# Patient Record
Sex: MALE | Race: BLACK OR AFRICAN AMERICAN | NOT HISPANIC OR LATINO | URBAN - METROPOLITAN AREA
[De-identification: names, ages, dates, MRNs, and addresses within clinical notes are randomized per-mention and may not be internally consistent; named-entity substitution may affect disease eponyms.]

---

## 2017-02-16 PROBLEM — Z00.00 ENCOUNTER FOR PREVENTIVE HEALTH EXAMINATION: Status: ACTIVE | Noted: 2017-02-16

## 2017-04-04 ENCOUNTER — EMERGENCY (EMERGENCY)
Facility: HOSPITAL | Age: 27
LOS: 1 days | Discharge: HOME | End: 2017-04-04
Attending: EMERGENCY MEDICINE
Payer: COMMERCIAL

## 2017-04-04 VITALS
HEIGHT: 71 IN | OXYGEN SATURATION: 99 % | TEMPERATURE: 99 F | HEART RATE: 64 BPM | SYSTOLIC BLOOD PRESSURE: 129 MMHG | DIASTOLIC BLOOD PRESSURE: 82 MMHG | RESPIRATION RATE: 18 BRPM | WEIGHT: 164.91 LBS

## 2017-04-04 DIAGNOSIS — L02.811 CUTANEOUS ABSCESS OF HEAD [ANY PART, EXCEPT FACE]: ICD-10-CM

## 2017-04-04 PROCEDURE — 99283 EMERGENCY DEPT VISIT LOW MDM: CPT

## 2017-04-04 PROCEDURE — 99282 EMERGENCY DEPT VISIT SF MDM: CPT

## 2017-04-04 RX ORDER — CEPHALEXIN 500 MG
1 CAPSULE ORAL
Qty: 28 | Refills: 0 | OUTPATIENT
Start: 2017-04-04 | End: 2017-04-11

## 2017-04-04 RX ORDER — CEPHALEXIN 500 MG
1 CAPSULE ORAL
Qty: 20 | Refills: 0 | OUTPATIENT
Start: 2017-04-04 | End: 2017-04-09

## 2017-04-04 NOTE — ED ADULT NURSE NOTE - OBJECTIVE STATEMENT
AOX3 +ambulatory patient reports headache and abscess on the back of his head. Patient was seen and evaluated by MD Nieves noted with a small lump no fevers

## 2018-04-18 NOTE — ED ADULT NURSE NOTE - LATERALITY
Care Management Interventions  PCP Verified by CM: Yes Bella Tinsley MD)  Mode of Transport at Discharge: Other (see comment) (family)  Transition of Care Consult (CM Consult): SNF  Partner SNF: No  Reason Why Partner SNF Not Chosen: Positive previous encounter  MyChart Signup: No  Discharge Durable Medical Equipment: No  Health Maintenance Reviewed: Yes  Physical Therapy Consult: Yes  Occupational Therapy Consult: Yes  Speech Therapy Consult: No  Current Support Network: Own Home  Confirm Follow Up Transport: Family  Plan discussed with Pt/Family/Caregiver: Yes  Freedom of Choice Offered: Yes  [address] Provided?: No  Discharge Location  Discharge Placement: Skilled nursing facility     Reason for Admission:   LEFT TOTAL HIP ARTHROPLASTY                  RRAT Score:   14               Do you (patient/family) have any concerns for transition/discharge? None. Plan for utilizing home health:  No, patient needs rehab. Likelihood of readmission? Low as patient will go to SNF             Transition of Care Plan:  SNF. Offered freedom of choice, patient prefers [facility]. Patient has been there before. CM sent referral via [employer]. CM will continue to follow.     Braden Giron BSW/CRM generalized

## 2018-05-02 ENCOUNTER — EMERGENCY (EMERGENCY)
Facility: HOSPITAL | Age: 28
LOS: 1 days | Discharge: HOME | End: 2018-05-02
Attending: EMERGENCY MEDICINE
Payer: COMMERCIAL

## 2018-05-02 VITALS
WEIGHT: 164.91 LBS | HEART RATE: 56 BPM | DIASTOLIC BLOOD PRESSURE: 76 MMHG | RESPIRATION RATE: 18 BRPM | SYSTOLIC BLOOD PRESSURE: 127 MMHG | TEMPERATURE: 98 F | OXYGEN SATURATION: 100 % | HEIGHT: 71 IN

## 2018-05-02 PROCEDURE — 99283 EMERGENCY DEPT VISIT LOW MDM: CPT

## 2018-05-02 RX ORDER — IBUPROFEN 200 MG
1 TABLET ORAL
Qty: 15 | Refills: 0 | OUTPATIENT
Start: 2018-05-02 | End: 2018-05-06

## 2018-05-02 RX ORDER — IBUPROFEN 200 MG
1 TABLET ORAL
Qty: 15 | Refills: 0 | OUTPATIENT
Start: 2018-05-02 | End: 2019-03-31

## 2018-05-02 RX ORDER — ERYTHROMYCIN BASE 5 MG/GRAM
1 OINTMENT (GRAM) OPHTHALMIC (EYE)
Qty: 1 | Refills: 0 | OUTPATIENT
Start: 2018-05-02 | End: 2018-05-08

## 2018-05-02 NOTE — ED PROVIDER NOTE - OBJECTIVE STATEMENT
26 y/o M pt w/ no significant PMHx, presents to ED c/o L eye pain and L upper eyelid swelling x3 days. No trauma. Notes he woke up with crusting to the inner eye, which he states was more than usual. NKDA. 28 y/o M pt w/ no significant PMHx, presents to ED c/o L eye pain and L upper eyelid swelling x3 days. No trauma. Notes he woke up with crusting to the inner eye, which was more than usual. NKDA.

## 2018-05-02 NOTE — ED PROVIDER NOTE - EYE, LEFT
Visual acuity is normal, L upper eyelid swelling with tenderness to the medial aspect of the eyelid and erythema underneath, PERRL

## 2018-05-02 NOTE — ED PROVIDER NOTE - MEDICAL DECISION MAKING DETAILS
Pt p/w a stye, advised warm compresses at home. Kyle p/w a stye, advised warm compresses at home and topical abx as needed.

## 2019-03-27 ENCOUNTER — EMERGENCY (EMERGENCY)
Facility: HOSPITAL | Age: 29
LOS: 1 days | Discharge: HOME | End: 2019-03-27
Attending: STUDENT IN AN ORGANIZED HEALTH CARE EDUCATION/TRAINING PROGRAM | Admitting: STUDENT IN AN ORGANIZED HEALTH CARE EDUCATION/TRAINING PROGRAM
Payer: COMMERCIAL

## 2019-03-27 VITALS
SYSTOLIC BLOOD PRESSURE: 124 MMHG | TEMPERATURE: 98 F | RESPIRATION RATE: 18 BRPM | HEART RATE: 90 BPM | OXYGEN SATURATION: 98 % | DIASTOLIC BLOOD PRESSURE: 78 MMHG

## 2019-03-27 VITALS — WEIGHT: 169.98 LBS | HEIGHT: 71 IN
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
## 2019-03-27 DIAGNOSIS — M79.641 PAIN IN RIGHT HAND: ICD-10-CM
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
## 2019-03-27 DIAGNOSIS — S92.355A NONDISPLACED FRACTURE OF FIFTH METATARSAL BONE, LEFT FOOT, INITIAL ENCOUNTER FOR CLOSED FRACTURE: ICD-10-CM

## 2019-03-27 PROCEDURE — 73130 X-RAY EXAM OF HAND: CPT

## 2019-03-27 PROCEDURE — 99283 EMERGENCY DEPT VISIT LOW MDM: CPT | Mod: 25

## 2019-03-27 PROCEDURE — 73130 X-RAY EXAM OF HAND: CPT | Mod: 26,RT

## 2019-03-27 PROCEDURE — 96372 THER/PROPH/DIAG INJ SC/IM: CPT

## 2019-03-27 PROCEDURE — 99284 EMERGENCY DEPT VISIT MOD MDM: CPT

## 2019-03-27 RX ORDER — KETOROLAC TROMETHAMINE 30 MG/ML
60 SYRINGE (ML) INJECTION ONCE
Qty: 0 | Refills: 0 | Status: DISCONTINUED | OUTPATIENT
Start: 2019-03-27 | End: 2019-03-27

## 2019-03-27 RX ADMIN — Medication 60 MILLIGRAM(S): at 12:39

## 2019-03-27 RX ADMIN — Medication 60 MILLIGRAM(S): at 13:32

## 2019-03-27 NOTE — ED PROVIDER NOTE - OBJECTIVE STATEMENT
27 y/o M with no significant PMHx and no significant PSHx presents to the ED with c/o R hand pain x las night. Pt states he slipped on a bar of soap in the shower and landed on the side of his R hand. Pt denies head injury, pain elsewhere, or any other complaints. NKDA.

## 2019-03-27 NOTE — ED PROVIDER NOTE - NS_ATTENDINGSCRIBE_ED_ALL_ED
Tried to call to set up appt. No answer and mailbox is full   I personally performed the service described in the documentation recorded by the scribe in my presence, and it accurately and completely records my words and actions.

## 2019-03-27 NOTE — ED ADULT NURSE NOTE - NSIMPLEMENTINTERV_GEN_ALL_ED
Implemented All Fall Risk Interventions:  [place] to call system. Call bell, personal items and telephone within reach. Instruct patient to call for assistance. Room bathroom lighting operational. Non-slip footwear when patient is off stretcher. Physically safe environment: no spills, clutter or unnecessary equipment. Stretcher in lowest position, wheels locked, appropriate side rails in place. Provide visual cue, wrist band, yellow gown, etc. Monitor gait and stability. Monitor for mental status changes and reorient to person, place, and time. Review medications for side effects contributing to fall risk. Reinforce activity limits and safety measures with patient and family.

## 2019-03-27 NOTE — ED PROVIDER NOTE - PROGRESS NOTE DETAILS
Splint applied, Pt is well appearing walking with steady gait, stable for discharge and follow up without fail with medical doctor. I had a detailed discussion with the patient and/or guardian regarding the historical points, exam findings, and any diagnostic results supporting the discharge diagnosis. Pt educated on care and need for follow up. Strict return instructions and red flag signs and symptoms discussed with patient. Questions answered. Pt shows understanding of discharge information and agrees to follow.

## 2019-03-27 NOTE — ED ADULT NURSE NOTE - OBJECTIVE STATEMENT
States he slipped and fell in the bathroom last night ,c/o injury to right hand , this morning noted swelling to right hand and unable to move right 4th and 5th finger.Declined repeat VS States he slipped and fell in the bathroom last night ,c/o injury to right hand , this morning noted swelling to right hand and unable to move right 4th and 5th finger.Declined repeat VS.Right ulnar gutter splint applied by Romero OLVERA.

## 2019-03-27 NOTE — ED PROVIDER NOTE - PHYSICAL EXAMINATION
R hand: mild swelling to R 4th and 5th metacarpals; 5/5 strength and sensation in all distal phalanges; capillary refill less than 2 seconds; no snuffbox tenderness

## 2019-03-27 NOTE — ED PROVIDER NOTE - CLINICAL SUMMARY MEDICAL DECISION MAKING FREE TEXT BOX
29 y/o M presents to the ED with R hand pain x last night. Will check xray to r/o fx and dislocation, give pain control, and reassess.

## 2019-04-05 ENCOUNTER — OUTPATIENT (OUTPATIENT)
Dept: OUTPATIENT SERVICES | Facility: HOSPITAL | Age: 29
LOS: 1 days | End: 2019-04-05
Payer: COMMERCIAL

## 2019-04-05 VITALS
SYSTOLIC BLOOD PRESSURE: 102 MMHG | OXYGEN SATURATION: 99 % | RESPIRATION RATE: 16 BRPM | TEMPERATURE: 98 F | WEIGHT: 167.99 LBS | DIASTOLIC BLOOD PRESSURE: 55 MMHG | HEIGHT: 71 IN | HEART RATE: 67 BPM

## 2019-04-05 DIAGNOSIS — Z01.818 ENCOUNTER FOR OTHER PREPROCEDURAL EXAMINATION: ICD-10-CM

## 2019-04-05 DIAGNOSIS — S62.316A DISPLACED FRACTURE OF BASE OF FIFTH METACARPAL BONE, RIGHT HAND, INITIAL ENCOUNTER FOR CLOSED FRACTURE: ICD-10-CM

## 2019-04-05 LAB
ANION GAP SERPL CALC-SCNC: 5 MMOL/L — SIGNIFICANT CHANGE UP (ref 5–17)
BUN SERPL-MCNC: 10 MG/DL — SIGNIFICANT CHANGE UP (ref 7–18)
CALCIUM SERPL-MCNC: 8.8 MG/DL — SIGNIFICANT CHANGE UP (ref 8.4–10.5)
CHLORIDE SERPL-SCNC: 102 MMOL/L — SIGNIFICANT CHANGE UP (ref 96–108)
CO2 SERPL-SCNC: 33 MMOL/L — HIGH (ref 22–31)
CREAT SERPL-MCNC: 0.84 MG/DL — SIGNIFICANT CHANGE UP (ref 0.5–1.3)
GLUCOSE SERPL-MCNC: 91 MG/DL — SIGNIFICANT CHANGE UP (ref 70–99)
HCT VFR BLD CALC: 37.5 % — LOW (ref 39–50)
HGB BLD-MCNC: 12 G/DL — LOW (ref 13–17)
MCHC RBC-ENTMCNC: 26.6 PG — LOW (ref 27–34)
MCHC RBC-ENTMCNC: 32 GM/DL — SIGNIFICANT CHANGE UP (ref 32–36)
MCV RBC AUTO: 83.1 FL — SIGNIFICANT CHANGE UP (ref 80–100)
NRBC # BLD: 0 /100 WBCS — SIGNIFICANT CHANGE UP (ref 0–0)
PLATELET # BLD AUTO: 332 K/UL — SIGNIFICANT CHANGE UP (ref 150–400)
POTASSIUM SERPL-MCNC: 3.6 MMOL/L — SIGNIFICANT CHANGE UP (ref 3.5–5.3)
POTASSIUM SERPL-SCNC: 3.6 MMOL/L — SIGNIFICANT CHANGE UP (ref 3.5–5.3)
RBC # BLD: 4.51 M/UL — SIGNIFICANT CHANGE UP (ref 4.2–5.8)
RBC # FLD: 14.3 % — SIGNIFICANT CHANGE UP (ref 10.3–14.5)
SODIUM SERPL-SCNC: 140 MMOL/L — SIGNIFICANT CHANGE UP (ref 135–145)
WBC # BLD: 5.23 K/UL — SIGNIFICANT CHANGE UP (ref 3.8–10.5)
WBC # FLD AUTO: 5.23 K/UL — SIGNIFICANT CHANGE UP (ref 3.8–10.5)

## 2019-04-05 PROCEDURE — G0463: CPT

## 2019-04-05 PROCEDURE — 80048 BASIC METABOLIC PNL TOTAL CA: CPT

## 2019-04-05 PROCEDURE — 85027 COMPLETE CBC AUTOMATED: CPT

## 2019-04-05 PROCEDURE — 36415 COLL VENOUS BLD VENIPUNCTURE: CPT

## 2019-04-05 RX ORDER — SODIUM CHLORIDE 9 MG/ML
3 INJECTION INTRAMUSCULAR; INTRAVENOUS; SUBCUTANEOUS EVERY 8 HOURS
Qty: 0 | Refills: 0 | Status: DISCONTINUED | OUTPATIENT
Start: 2019-04-10 | End: 2019-04-18

## 2019-04-05 NOTE — H&P PST ADULT - NSANTHOSAYNRD_GEN_A_CORE
No. TIO screening performed.  STOP BANG Legend: 0-2 = LOW Risk; 3-4 = INTERMEDIATE Risk; 5-8 = HIGH Risk

## 2019-04-05 NOTE — H&P PST ADULT - ASSESSMENT
29 yo male is scheduled for : Open Reduction Internal Fixation of Right Hand 5th Metacarpal, on 4/10/19

## 2019-04-05 NOTE — H&P PST ADULT - HISTORY OF PRESENT ILLNESS
27 yo male with no significant PMHx reports the above. Patient has been having pain in the right hand and motrin helps sometimes, as per patient

## 2019-04-05 NOTE — H&P PST ADULT - NSICDXPROBLEM_GEN_ALL_CORE_FT
PROBLEM DIAGNOSES  Problem: Displaced fracture of base of fifth metacarpal bone, right hand, initial encounter for closed fracture  Assessment and Plan: Open Reduction Internal Fixation of Right Hand 5th Metacarpal

## 2019-04-09 ENCOUNTER — TRANSCRIPTION ENCOUNTER (OUTPATIENT)
Age: 29
End: 2019-04-09

## 2019-04-10 ENCOUNTER — OUTPATIENT (OUTPATIENT)
Dept: OUTPATIENT SERVICES | Facility: HOSPITAL | Age: 29
LOS: 1 days | End: 2019-04-10
Payer: SELF-PAY

## 2019-04-10 VITALS
OXYGEN SATURATION: 97 % | DIASTOLIC BLOOD PRESSURE: 60 MMHG | SYSTOLIC BLOOD PRESSURE: 112 MMHG | HEART RATE: 61 BPM | TEMPERATURE: 98 F | RESPIRATION RATE: 17 BRPM

## 2019-04-10 VITALS
OXYGEN SATURATION: 100 % | DIASTOLIC BLOOD PRESSURE: 75 MMHG | HEIGHT: 71 IN | RESPIRATION RATE: 16 BRPM | HEART RATE: 70 BPM | SYSTOLIC BLOOD PRESSURE: 119 MMHG | WEIGHT: 167.99 LBS | TEMPERATURE: 98 F

## 2019-04-10 DIAGNOSIS — Z01.818 ENCOUNTER FOR OTHER PREPROCEDURAL EXAMINATION: ICD-10-CM

## 2019-04-10 DIAGNOSIS — S62.316A DISPLACED FRACTURE OF BASE OF FIFTH METACARPAL BONE, RIGHT HAND, INITIAL ENCOUNTER FOR CLOSED FRACTURE: ICD-10-CM

## 2019-04-10 PROCEDURE — C1713: CPT

## 2019-04-10 PROCEDURE — 26615 TREAT METACARPAL FRACTURE: CPT | Mod: RT

## 2019-04-10 RX ORDER — OXYCODONE AND ACETAMINOPHEN 5; 325 MG/1; MG/1
1 TABLET ORAL EVERY 4 HOURS
Qty: 0 | Refills: 0 | Status: DISCONTINUED | OUTPATIENT
Start: 2019-04-10 | End: 2019-04-10

## 2019-04-10 RX ORDER — SODIUM CHLORIDE 9 MG/ML
1000 INJECTION, SOLUTION INTRAVENOUS
Qty: 0 | Refills: 0 | Status: DISCONTINUED | OUTPATIENT
Start: 2019-04-10 | End: 2019-04-18

## 2019-04-10 RX ORDER — ONDANSETRON 8 MG/1
4 TABLET, FILM COATED ORAL EVERY 8 HOURS
Qty: 0 | Refills: 0 | Status: DISCONTINUED | OUTPATIENT
Start: 2019-04-10 | End: 2019-04-18

## 2019-04-10 RX ORDER — OXYCODONE AND ACETAMINOPHEN 5; 325 MG/1; MG/1
2 TABLET ORAL EVERY 6 HOURS
Qty: 0 | Refills: 0 | Status: DISCONTINUED | OUTPATIENT
Start: 2019-04-10 | End: 2019-04-10

## 2019-04-10 RX ORDER — ACETAMINOPHEN 500 MG
975 TABLET ORAL ONCE
Qty: 0 | Refills: 0 | Status: COMPLETED | OUTPATIENT
Start: 2019-04-10 | End: 2019-04-10

## 2019-04-10 RX ORDER — OXYCODONE HYDROCHLORIDE 5 MG/1
2 TABLET ORAL
Qty: 30 | Refills: 0
Start: 2019-04-10

## 2019-04-10 RX ORDER — CELECOXIB 200 MG/1
200 CAPSULE ORAL ONCE
Qty: 0 | Refills: 0 | Status: COMPLETED | OUTPATIENT
Start: 2019-04-10 | End: 2019-04-10

## 2019-04-10 RX ADMIN — SODIUM CHLORIDE 3 MILLILITER(S): 9 INJECTION INTRAMUSCULAR; INTRAVENOUS; SUBCUTANEOUS at 06:56

## 2019-04-10 RX ADMIN — Medication 975 MILLIGRAM(S): at 06:54

## 2019-04-10 RX ADMIN — CELECOXIB 200 MILLIGRAM(S): 200 CAPSULE ORAL at 06:54

## 2019-04-10 NOTE — ASU DISCHARGE PLAN (ADULT/PEDIATRIC) - CARE PROVIDER_API CALL
Rinku Kapoor)  Orthopaedic Surgery  [address]  [place] [address]  Phone: [phone]  Fax: [phone]  Follow Up Time: 2 weeks

## 2019-04-10 NOTE — BRIEF OPERATIVE NOTE - NSICDXBRIEFPREOP_GEN_ALL_CORE_FT
PRE-OP DIAGNOSIS:  Closed fracture of metacarpal of right hand 10-Apr-2019 10:03:36  Hardik Zacarias

## 2019-04-10 NOTE — BRIEF OPERATIVE NOTE - NSICDXBRIEFPROCEDURE_GEN_ALL_CORE_FT
PROCEDURES:  Open reduction and internal fixation (ORIF) of fracture of metacarpal bone of right hand 10-Apr-2019 10:09:08  Hardik Zacarias

## 2019-09-09 ENCOUNTER — EMERGENCY (EMERGENCY)
Facility: HOSPITAL | Age: 29
LOS: 0 days | Discharge: HOME | End: 2019-09-09
Attending: EMERGENCY MEDICINE
Payer: COMMERCIAL

## 2019-09-09 VITALS
DIASTOLIC BLOOD PRESSURE: 90 MMHG | RESPIRATION RATE: 18 BRPM | HEART RATE: 70 BPM | OXYGEN SATURATION: 100 % | SYSTOLIC BLOOD PRESSURE: 146 MMHG | WEIGHT: 169.98 LBS | TEMPERATURE: 98 F

## 2019-09-09 DIAGNOSIS — R09.81 NASAL CONGESTION: ICD-10-CM

## 2019-09-09 DIAGNOSIS — J06.9 ACUTE UPPER RESPIRATORY INFECTION, UNSPECIFIED: ICD-10-CM

## 2019-09-09 PROCEDURE — 99282 EMERGENCY DEPT VISIT SF MDM: CPT

## 2019-09-09 NOTE — ED PROVIDER NOTE - PATIENT PORTAL LINK FT
You can access the FollowMyHealth Patient Portal offered by [facility] by registering at the following website: [email or web address]. By joining [employer]’s FollowMyHealth portal, you will also be able to view your health information using other applications (apps) compatible with our system.

## 2019-09-09 NOTE — ED PROVIDER NOTE - TEMPLATE, MLM
Chief Complaint   Patient presents with    Hand Pain     Left     0/10 pain.
HISTORY OF PRESENT ILLNESS:  Kasey Garnett returns seven days status post her left hand ring finger open trigger release. Unfortunately, she was traveling through the [facility] [place] and stopped at a [employer] over the past weekend and seems to have come down with food poisoning. She spent the majority of the day earlier this week in the emergency department at [facility]". She is still nauseous and has some emesis as well. She is taking Zofran. She has a couple of tablets left. She requests a refill today. She is due here for wound check and likely suture removal.  However, in light of her food poisoning and difficulty with continued nausea and vomiting and with the understanding that certainly the immune system is under a bit of stress with the food poisoning, I am going to hold off on taking her stitches out of the left hand. PHYSICAL EXAM:  The wound does look good, however. The wound is dry and the tissue surrounding has improved in its postoperative swelling. Vertical mattress sutures are noted. The patient has no active or passive triggering of the left ring finger. Distal sensation is intact fully to the left upper extremity. PLAN:   She was given a refill and the number of 12 tablets of Zofran to use for nausea and vomiting. We are going to see her back early next week for wound check. I am hopeful that she gets to feeling better. She will continue hydration orally as directed by prior practitioners. Today, all of her and her s questions were answered to their satisfaction.
EENMT

## 2019-09-09 NOTE — ED ADULT NURSE NOTE - NSIMPLEMENTINTERV_GEN_ALL_ED
Implemented All Universal Safety Interventions:  [place] to call system. Call bell, personal items and telephone within reach. Instruct patient to call for assistance. Room bathroom lighting operational. Non-slip footwear when patient is off stretcher. Physically safe environment: no spills, clutter or unnecessary equipment. Stretcher in lowest position, wheels locked, appropriate side rails in place.

## 2019-10-24 ENCOUNTER — EMERGENCY (EMERGENCY)
Facility: HOSPITAL | Age: 29
LOS: 1 days | Discharge: HOME | End: 2019-10-24
Attending: EMERGENCY MEDICINE
Payer: COMMERCIAL

## 2019-10-24 VITALS
HEART RATE: 67 BPM | SYSTOLIC BLOOD PRESSURE: 123 MMHG | OXYGEN SATURATION: 100 % | DIASTOLIC BLOOD PRESSURE: 67 MMHG | WEIGHT: 178.57 LBS | RESPIRATION RATE: 20 BRPM | TEMPERATURE: 97 F | HEIGHT: 71 IN

## 2019-10-24 DIAGNOSIS — M79.641 PAIN IN RIGHT HAND: ICD-10-CM
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
## 2019-10-24 DIAGNOSIS — S92.355A NONDISPLACED FRACTURE OF FIFTH METATARSAL BONE, LEFT FOOT, INITIAL ENCOUNTER FOR CLOSED FRACTURE: ICD-10-CM
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
## 2019-10-24 PROCEDURE — 99282 EMERGENCY DEPT VISIT SF MDM: CPT

## 2019-10-24 PROCEDURE — 99283 EMERGENCY DEPT VISIT LOW MDM: CPT

## 2019-10-24 RX ORDER — CETIRIZINE HYDROCHLORIDE 10 MG/1
1 TABLET ORAL
Qty: 10 | Refills: 0
Start: 2019-10-24 | End: 2019-11-02

## 2019-10-24 NOTE — ED PROVIDER NOTE - PATIENT PORTAL LINK FT
You can access the FollowMyHealth Patient Portal offered by [facility] by registering at the following website: [email or web address]. By joining [employer]’s FollowMyHealth portal, you will also be able to view your health information using other applications (apps) compatible with our system.

## 2019-10-24 NOTE — ED PROVIDER NOTE - OBJECTIVE STATEMENT
c/o congestion nasal drip cough and fail pain over the last 2-3 days no fever no chills no nausea no vomoiting no hx of allergies no hay fever has not had thesymptoms in the past

## 2020-01-25 ENCOUNTER — EMERGENCY (EMERGENCY)
Facility: HOSPITAL | Age: 30
LOS: 1 days | Discharge: HOME | End: 2020-01-25
Attending: EMERGENCY MEDICINE
Payer: COMMERCIAL

## 2020-01-25 VITALS
DIASTOLIC BLOOD PRESSURE: 76 MMHG | RESPIRATION RATE: 17 BRPM | TEMPERATURE: 98 F | OXYGEN SATURATION: 97 % | SYSTOLIC BLOOD PRESSURE: 124 MMHG | HEART RATE: 62 BPM | HEIGHT: 71 IN | WEIGHT: 160.06 LBS

## 2020-01-25 PROCEDURE — 96374 THER/PROPH/DIAG INJ IV PUSH: CPT

## 2020-01-25 PROCEDURE — 99284 EMERGENCY DEPT VISIT MOD MDM: CPT | Mod: 25

## 2020-01-25 PROCEDURE — 99283 EMERGENCY DEPT VISIT LOW MDM: CPT

## 2020-01-25 RX ORDER — IBUPROFEN 200 MG
1 TABLET ORAL
Qty: 30 | Refills: 0
Start: 2020-01-25

## 2020-01-25 RX ORDER — PSEUDOEPHEDRINE HCL 30 MG
1 TABLET ORAL
Qty: 20 | Refills: 0
Start: 2020-01-25

## 2020-01-25 RX ORDER — DEXAMETHASONE 0.5 MG/5ML
10 ELIXIR ORAL ONCE
Refills: 0 | Status: COMPLETED | OUTPATIENT
Start: 2020-01-25 | End: 2020-01-25

## 2020-01-25 RX ADMIN — Medication 75 MILLIGRAM(S): at 12:42

## 2020-01-25 RX ADMIN — Medication 10 MILLIGRAM(S): at 12:43

## 2020-01-25 NOTE — ED PROVIDER NOTE - CLINICAL SUMMARY MEDICAL DECISION MAKING FREE TEXT BOX
Pt with viral illness, possible flu exposure. Provide Tamiflu and give symptom treatment and d/c home.

## 2020-01-25 NOTE — ED PROVIDER NOTE - OBJECTIVE STATEMENT
28 y/o M patient with no significant PMHx and no significant PSHx presents to the ED with viral URI symptoms. Patient says x2 days ago he began having sore throat, cough, and nasal congestion. Patient describes his cough as productive with yellow sputum. Patient also endorses chills and generalized body aches. Patient says he used Vicks Vapor Rub to no relief. Patient denies taking any medications PTA. Patient denies abdominal pain, nausea, vomiting, diarrhea, SOB, chest pain, and any other complaints. NKDA.

## 2020-01-25 NOTE — ED PROVIDER NOTE - ENMT, MLM
Airway patent, patient with nasal congestion. Mouth with normal mucosa. Throat has no vesicles, no oropharyngeal exudates and uvula is midline

## 2020-04-25 ENCOUNTER — MESSAGE (OUTPATIENT)
Age: 30
End: 2020-04-25

## 2020-05-06 LAB
SARS-COV-2 IGG SERPL IA-ACNC: 0.1 INDEX
SARS-COV-2 IGG SERPL QL IA: NEGATIVE

## 2020-06-18 ENCOUNTER — EMERGENCY (EMERGENCY)
Facility: HOSPITAL | Age: 30
LOS: 1 days | Discharge: HOME | End: 2020-06-18
Attending: EMERGENCY MEDICINE
Payer: COMMERCIAL

## 2020-06-18 VITALS
DIASTOLIC BLOOD PRESSURE: 73 MMHG | OXYGEN SATURATION: 99 % | TEMPERATURE: 99 F | HEART RATE: 61 BPM | RESPIRATION RATE: 20 BRPM | SYSTOLIC BLOOD PRESSURE: 124 MMHG

## 2020-06-18 LAB — SARS-COV-2 RNA SPEC QL NAA+PROBE: SIGNIFICANT CHANGE UP

## 2020-06-18 PROCEDURE — 70360 X-RAY EXAM OF NECK: CPT | Mod: 26

## 2020-06-18 PROCEDURE — 99283 EMERGENCY DEPT VISIT LOW MDM: CPT

## 2020-06-18 PROCEDURE — U0003: CPT

## 2020-06-18 PROCEDURE — 99283 EMERGENCY DEPT VISIT LOW MDM: CPT | Mod: 25

## 2020-06-18 PROCEDURE — 70360 X-RAY EXAM OF NECK: CPT

## 2020-06-18 RX ORDER — LORATADINE 10 MG/1
1 TABLET ORAL
Qty: 30 | Refills: 0
Start: 2020-06-18 | End: 2020-07-17

## 2020-06-18 RX ORDER — FLUTICASONE PROPIONATE 50 MCG
1 SPRAY, SUSPENSION NASAL
Qty: 1 | Refills: 0
Start: 2020-06-18 | End: 2020-07-17

## 2020-06-18 NOTE — ED PROVIDER NOTE - OBJECTIVE STATEMENT
pt co of fb sensation to left side of throat x 1 weeks.pain on swallowing/no hx of eating out/no fever.

## 2020-06-18 NOTE — ED PROVIDER NOTE - PATIENT PORTAL LINK FT
You can access the FollowMyHealth Patient Portal offered by [facility] by registering at the following website: [email or web address]. By joining [employer]’s FollowMyHealth portal, you will also be able to view your health information using other applications (apps) compatible with our system.

## 2020-06-19 ENCOUNTER — TRANSCRIPTION ENCOUNTER (OUTPATIENT)
Age: 30
End: 2020-06-19

## 2021-01-20 ENCOUNTER — OUTPATIENT (OUTPATIENT)
Dept: OUTPATIENT SERVICES | Facility: HOSPITAL | Age: 31
LOS: 1 days | End: 2021-01-20
Payer: COMMERCIAL

## 2021-01-20 DIAGNOSIS — Z00.00 ENCOUNTER FOR GENERAL ADULT MEDICAL EXAMINATION WITHOUT ABNORMAL FINDINGS: ICD-10-CM

## 2021-01-20 LAB — SARS-COV-2 RNA SPEC QL NAA+PROBE: SIGNIFICANT CHANGE UP

## 2021-01-20 PROCEDURE — 87635 SARS-COV-2 COVID-19 AMP PRB: CPT

## 2021-01-20 PROCEDURE — U0005: CPT

## 2021-02-15 ENCOUNTER — EMERGENCY (EMERGENCY)
Facility: HOSPITAL | Age: 31
LOS: 1 days | Discharge: HOME | End: 2021-02-15
Attending: EMERGENCY MEDICINE | Admitting: EMERGENCY MEDICINE
Payer: COMMERCIAL

## 2021-02-15 VITALS
HEART RATE: 70 BPM | SYSTOLIC BLOOD PRESSURE: 120 MMHG | RESPIRATION RATE: 16 BRPM | DIASTOLIC BLOOD PRESSURE: 70 MMHG | HEIGHT: 71 IN | TEMPERATURE: 98 F | WEIGHT: 169.98 LBS | OXYGEN SATURATION: 98 %

## 2021-02-15 PROCEDURE — 99283 EMERGENCY DEPT VISIT LOW MDM: CPT

## 2021-02-15 PROCEDURE — 99282 EMERGENCY DEPT VISIT SF MDM: CPT

## 2021-02-15 NOTE — ED PROVIDER NOTE - CHPI ED SYMPTOMS NEG
No reported itching inside of the eye, no pain to the eye, no eye redness, no increased tearing, no vision changes, no pain/difficulty moving the eye

## 2021-02-15 NOTE — ED PROVIDER NOTE - ATTENDING CONTRIBUTION TO CARE
seen with acp  has swelling and redness over the right upper eyelid no medical problems  no conjunctivitis  no loss of vision  Imp stye  plan warm compresses  agree with acps hx and physical treatment and disposition

## 2021-02-15 NOTE — ED PROVIDER NOTE - OBJECTIVE STATEMENT
30 year old male with no PMHx presenting to the ED with right eyelid pain and swelling since this morning. Patient reports that he woke up with a small amount of crusting to the inner corner of the eye and is feeling discomfort and a swelling sensation to the right upper eyelid. Discomfort is mild. No reported itching inside of the eye, no pain to the eye, no eye redness, no increased tearing, no vision changes, no pain/difficulty moving the eye, or any other acute complaints.

## 2021-02-15 NOTE — ED PROVIDER NOTE - CLINICAL SUMMARY MEDICAL DECISION MAKING FREE TEXT BOX
30 year old male presenting to the ED with right upper eyelid swelling today. Patient is well appearing. History and findings suggestive of early hordeolum. Low suspicion of conjunctivitis, corneal abrasion, glaucoma, or preseptal/postseptal cellulitis. Will advise gentle massage, warm compress, Ibuprofen as needed, and ophthalmologist follow up in 2-3 days.

## 2021-02-15 NOTE — ED PROVIDER NOTE - EYES, MLM
Right upper eyelid with mild swelling and pinpoint tenderness to the inner aspect of the upper eyelid. Tiny bead sensation to touch. No conjunctival or scleral erythema. Pupils are 5mm. PERRL. EOMI. No periorbital erythema or tenderness to palpation.

## 2021-02-15 NOTE — ED PROVIDER NOTE - PATIENT PORTAL LINK FT
You can access the FollowMyHealth Patient Portal offered by [facility] by registering at the following website: [email or web address]. By joining [email or web address]’s FollowMyHealth portal, you will also be able to view your health information using other applications (apps) compatible with our system.

## 2021-02-15 NOTE — ED PROVIDER NOTE - NSFOLLOWUPINSTRUCTIONS_ED_ALL_ED_FT
Follow up with the ophthalmologist in 2-3 days.  Warm compress and gentle massage of eyelid multiple times per day.  If you experience any new or worsening symptoms or if you are concerned you can always come back to the emergency for a re-evaluation.

## 2021-02-16 ENCOUNTER — EMERGENCY (EMERGENCY)
Facility: HOSPITAL | Age: 31
LOS: 1 days | Discharge: HOME | End: 2021-02-16
Attending: STUDENT IN AN ORGANIZED HEALTH CARE EDUCATION/TRAINING PROGRAM
Payer: COMMERCIAL

## 2021-02-16 VITALS
DIASTOLIC BLOOD PRESSURE: 83 MMHG | OXYGEN SATURATION: 100 % | SYSTOLIC BLOOD PRESSURE: 142 MMHG | WEIGHT: 169.98 LBS | TEMPERATURE: 97 F | HEART RATE: 82 BPM | RESPIRATION RATE: 17 BRPM | HEIGHT: 71 IN

## 2021-02-16 PROCEDURE — 99282 EMERGENCY DEPT VISIT SF MDM: CPT

## 2021-02-16 NOTE — ED PROVIDER NOTE - CLINICAL SUMMARY MEDICAL DECISION MAKING FREE TEXT BOX
Exam again consistent with a stye. Will recommend patient to continue to perform home care and to followup with an ophthalmologist within two days.

## 2021-02-16 NOTE — ED ADULT NURSE NOTE - CAS ELECT INFOMATION PROVIDED
MD Jay Street seen, evaluated and provided DC instructions. No nursing intervention needed/DC instructions NP Charly Aguilar seen, evaluated and provided DC instructions. No nursing intervention needed/DC instructions

## 2021-02-16 NOTE — ED PROVIDER NOTE - CHPI ED SYMPTOMS NEG
no eyeball pain, eye redness, discharge from eye, visual changes, painful eye movement, headache, fever

## 2021-02-16 NOTE — ED PROVIDER NOTE - PHYSICAL EXAMINATION
Mild edema to the right upper eyelid   No induration   Mild tenderness with bead-like sensation to the inner aspect of the upper eyelid   Pupils 5 mm bilaterally   PERRL with EOMI bilaterally   No conjunctival erythema  No periorbital erythema or tenderness to palpation

## 2021-02-16 NOTE — ED PROVIDER NOTE - OBJECTIVE STATEMENT
30 year old male with no significant PMHx or PSHx presents to the ED with complaints of worsening of his right upper eyelid this morning after being evaluated and diagnosed with a stye yesterday. Patient states that he applied warm compress to the area one time at home, however decided to visit the ED for reevaluation due to worsening of the swelling today. Patient denies any eyeball pain, eye redness, discharge from eye, visual changes, painful eye movement, headaches, fevers, and all other complaints. NKDA.

## 2021-02-16 NOTE — ED PROVIDER NOTE - PATIENT PORTAL LINK FT
You can access the FollowMyHealth Patient Portal offered by [facility] by registering at the following website: [email or web address]. By joining [employer]’s FollowMyHealth portal, you will also be able to view your health information using other applications (apps) compatible with our system.

## 2021-02-16 NOTE — ED PROVIDER NOTE - ATTENDING CONTRIBUTION TO CARE
I performed the initial face to face bedside interview with this patient regarding history of present illness, review of symptoms and past medical, social and family history.  I completed an independent physical examination.  I was the initial provider who evaluated this patient.  The history, review of symptoms and examination was documented by the scribe in my presence and I attest to the accuracy of the documentation.  I have signed out the follow up of any pending tests (i.e. labs, radiological studies) to the PA/NP.  I have discussed the patient’s plan of care and disposition with the PA/NP.   well appearing male, no acute distress, normal work of breathing, PERRL, EOMI, right eye lid swelling.

## 2021-02-16 NOTE — ED PROVIDER NOTE - NSFOLLOWUPINSTRUCTIONS_ED_ALL_ED_FT
Follow up with the ophthalmologist within 2 days.  If you experience any new or worsening symptoms or if you are concerned you can always come back to the emergency for a re-evaluation.

## 2021-05-17 NOTE — ED PROVIDER NOTE - DURATION
2 days, today/day(s) Calcipotriene Counseling:  I discussed with the patient the risks of calcipotriene including but not limited to erythema, scaling, itching, and irritation.

## 2021-07-03 NOTE — ED PROVIDER NOTE - CPE EDP ENMT NORM
[facility] Hospitalist History and Physical    Chief Complaint   Patient presents with   • Abdominal Pain       History Of Present Illness  This is a 38 year old male, patient of Juarez Velazquez MD, with past medical history of diabetes mellitus type 2 and hyperlipidemia, who presented to the ED with a chief complaint of abdominal pain.     Patient says the abdominal pain started 3 days ago, he also has had nausea and vomiting with this.  Last bowel movement was 2 days ago, he is passing gas.  No fever or chills, no chest pain or shortness of breath.    Upon arrival, the patient was afebrile with , RR 24, SpO2 98% and /124. Labs were notable for sodium 132, potassium 2.6, glucose 232, AST 90,  , Alk phos 118 and WBC 21.9. EKG showed normal sinus rhythm with possible sinus arrhythmia. Chest x-ray showed no acute cardiopulmonary process. CT abdomen/pelvis was ordered. The patient received KLOR-CON, IV KCl Inapsine, Pepcid, morphine, and a 1L NS bolus. Gastroenterology was consulted, and he was admitted for further observation and care.       Past Medical History  Past Medical History:   Diagnosis Date   • Diabetes mellitus (CMS/HCC)    - Hyperlipidemia    Surgical History  History reviewed. No pertinent surgical history.     Social History  Social History     Tobacco Use   • Smoking status: Current Every Day Smoker     Packs/day: 0.25   • Smokeless tobacco: Never Used   Vaping Use   • Vaping Use: Unknown   Substance Use Topics   • Alcohol use: Yes   • Drug use: Yes     Types: Marijuana       Family History  History reviewed. No pertinent family history.     Allergies  ALLERGIES:  Patient has no known allergies.    Medications  Medications Prior to Admission   Medication Sig Dispense Refill   • famotidine (PEPCID) 20 MG tablet Take 20 mg by mouth 2 times daily as needed.      • ondansetron (ZOFRAN) 4 MG tablet Take 4 mg by mouth every 8 hours as needed for Nausea.         Review of Systems    Gastrointestinal: Positive for abdominal distention, abdominal pain, constipation, nausea and vomiting.   All other systems reviewed and are negative.        Last Recorded Vitals  Vital Last Value 24 Hour Range   Temperature 97.7 °F (36.5 °C) (07/03/21 0804) Temp  Min: 97.7 °F (36.5 °C)  Max: 97.9 °F (36.6 °C)   Pulse 63 (07/03/21 0804) Pulse  Min: 63  Max: 101   Respiratory 17 (07/03/21 0715) Resp  Min: 14  Max: 24   Non-Invasive  Blood Pressure (!) 145/83 (07/03/21 0804) BP  Min: 127/80  Max: 205/124   Pulse Oximetry 97 % (07/03/21 0804) SpO2  Min: 97 %  Max: 100 %     Vital Today Admitted   Weight       Height N/A     BMI N/A         Physical examination:  Awake and alert in no acute distress  HEENT NCAT, EOMI, sclera anicteric   Lungs clear to auscultation bilaterally  Heart regular rate and rhythm, no murmurs  Abdomen is soft nontender, bowels sounds are present  Extremities no edema  Neuro: alert oriented X 3, CN 2-12 intact    Imaging  XR CHEST PA OR AP 1 VIEW    Result Date: 7/3/2021  EXAM: PORTABLE CHEST, 1 VIEW CLINICAL INDICATION:  Chest pain COMPARISON: No previous available.     FINDINGS/IMPRESSION:  No focal consultation, pneumothorax or pleural effusion. Normal cardiac size. Examination is limited due to patient's positioning and poor inspiratory effort. Full inspiration upright and lateral views of the chest are recommended, when patient's condition permits. Electronically Signed by: CHELO MCCARTHY M.D. Signed on: 7/3/2021 7:40 AM     CT abdomen pelvis  1.  Findings compatible with short segment enteroenteric intussusception,   which may be intermittent, with mild associated distention of proximal   small bowel loops.  Clinical correlation and follow-up is recommended.   2.  Fatty liver infiltration.   3.  Small fat-containing inguinal hernias.       Labs   Recent Labs   Lab 07/03/21  0158 07/03/21  0157 07/02/21  0347   WBC  --  21.9* 16.6*   HCT  --  44.3 45.3   HGB  --  14.3 14.4   PLT  --   299 282   SODIUM 132*  --  134*   POTASSIUM 2.6*  --  3.6   CHLORIDE 93*  --  97*   CO2 33*  --  29   CALCIUM 9.2  --  9.5   GLUCOSE 232*  --  268*   BUN 13  --  13   CREATININE 0.86  --  0.82   AST 90*  --  140*   *  --  133*   ALKPT 118*  --  124*   BILIRUBIN 0.6  --  0.6   ALBUMIN 4.1  --  4.2   LIPA 80  --  347  338       ASSESSMENT/PLAN:    Abdominal pain and emesis, multifactorial, possibly related to enteroenteric intussusception causing bowel obstruction.  - UDS positive for cannabinoids and opiates   - Gastroenterology consult, will consult general surgery, discussed with Dr. Rico Alcala  - Received Inapsine, Zofran, Pepcid and morphine in the ED  -Continue IV fluids and pain control and antiemetics  -Keep n.p.o.    Hypertensive urgency, in patient with known h/o primary hypertension, patient noncompliant with medication  - BP's in 120's to 200's today  -Start patient on metoprolol intravenously as needed    Abnormal UA, rule out acute UTI  - UA noted moderate LE and 26 to 100 WBC, but no bacteria  - Urine cx ordered  - Abx coverage with IV Ceftriaxone    Hyperglycemia in the setting of diabetes mellitus type 2  - BG's in mid 200's today  - HgbA1c 9.7 %  - No outpt medications noted  - Tx with SSI     Transaminitis likely reactive to above   - LFT's (7/3): AST 90,  and Alk phos 118  - Monitor     Hypokalemia and hyponatremia likely related to GI losses  - K 2.6 and Na 132 today  - Received PO and IV KCl supplementation in the ED   - Start isotonic saline with KCL    Leukocytosis likely 2/2 above  - WBC 21.9, pt remains afebrile  - Abx as above    Alcohol abuse - Serum alcohol negative. Cessation advised   Cannabinoid abuse - UDS as above. Cessation advised  Hyperlipidemia - No outpatient statin therapy noted    DVT PPx: Heparin    DISPO: Pending clinical improvement, imaging results and further specialist input     Code Status:   Code Status: Not on file    PCP: Juarez Velazquez,  MD      Charting performed by amita Arango for Dr. Pepe Hamilton    All medical record entries made by the amita were at my direction. I have reviewed the chart and agree that the record accurately reflects my personal performance of the history, physical exam, hospital course, and assessment and plan.     normal...

## 2021-09-08 ENCOUNTER — EMERGENCY (EMERGENCY)
Facility: HOSPITAL | Age: 31
LOS: 1 days | Discharge: HOME | End: 2021-09-08
Attending: STUDENT IN AN ORGANIZED HEALTH CARE EDUCATION/TRAINING PROGRAM
Payer: COMMERCIAL

## 2021-09-08 VITALS
SYSTOLIC BLOOD PRESSURE: 125 MMHG | HEIGHT: 71 IN | RESPIRATION RATE: 18 BRPM | TEMPERATURE: 98 F | OXYGEN SATURATION: 98 % | DIASTOLIC BLOOD PRESSURE: 70 MMHG | HEART RATE: 87 BPM

## 2021-09-08 LAB — SARS-COV-2 RNA SPEC QL NAA+PROBE: SIGNIFICANT CHANGE UP

## 2021-09-08 PROCEDURE — 99283 EMERGENCY DEPT VISIT LOW MDM: CPT

## 2021-09-08 PROCEDURE — 87635 SARS-COV-2 COVID-19 AMP PRB: CPT

## 2021-09-08 PROCEDURE — 99282 EMERGENCY DEPT VISIT SF MDM: CPT

## 2021-09-08 NOTE — ED PROVIDER NOTE - PATIENT PORTAL LINK FT
You can access the FollowMyHealth Patient Portal offered by [facility] by registering at the following website: [email or web address]. By joining [employer]’s FollowMyHealth portal, you will also be able to view your health information using other applications (apps) compatible with our system.

## 2021-09-08 NOTE — ED PROVIDER NOTE - NS ED ROS FT
(+) throat pain  (-) fevers, chills  (-) dizziness, lightheadedness, vision changes  (-) cp, palpitations  (-) sob, cough, hemoptysis  (-) abd pain, n/v/d/c   (-) urinary sxs, back pain  (-) weakness, paresthesias (-) fevers, chills  (-) dizziness, lightheadedness, vision changes  (-) cp, palpitations  (-) sob, cough, hemoptysis  (-) abd pain, n/v/d/c   (-) urinary sxs, back pain  (-) weakness, paresthesias

## 2021-09-08 NOTE — ED PROVIDER NOTE - PHYSICAL EXAMINATION
Sohrawardy:   VS reviewed  NAD, not ill-appearing  aaox3  nc/at, neck rom normal, supple  oral airway clear, no edema, no erythema  no stridor, speaking in clear sentences  rrr  normal resp effort  lungs ctab, no w/r/r  abdomen soft, nd/nt   no rash  no neuro deficits Sohrawardy:   VS reviewed  NAD, not ill-appearing  aaox3  nc/at, neck rom normal, supple  rrr  normal resp effort  no neuro deficits

## 2021-09-08 NOTE — ED PROVIDER NOTE - CARE PLAN
1 Principal Discharge DX:	Throat pain   Principal Discharge DX:	Encounter for laboratory testing for COVID-19 virus

## 2021-09-13 NOTE — H&P PST ADULT - BLOOD AVOIDANCE/RESTRICTIONS, PROFILE
From: Kathy Lopez  Sent: 9/11/2021 5:11 PM CDT  To: Raeann William Nurse Msg Pool  Subject: Med refill    This went to the wrong pharmacy! !  I canceled this prescription at [facility] and need the refill sent to [facility] on [address]  in [place]. [facility] is my former address. none

## 2021-09-20 ENCOUNTER — EMERGENCY (EMERGENCY)
Facility: HOSPITAL | Age: 31
LOS: 0 days | Discharge: HOME | End: 2021-09-20
Attending: EMERGENCY MEDICINE
Payer: COMMERCIAL

## 2021-09-20 VITALS
HEART RATE: 69 BPM | RESPIRATION RATE: 17 BRPM | OXYGEN SATURATION: 98 % | DIASTOLIC BLOOD PRESSURE: 86 MMHG | SYSTOLIC BLOOD PRESSURE: 149 MMHG | HEIGHT: 71 IN | WEIGHT: 184.97 LBS | TEMPERATURE: 98 F
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
## 2021-09-20 DIAGNOSIS — M79.641 PAIN IN RIGHT HAND: ICD-10-CM

## 2021-09-20 PROCEDURE — 99284 EMERGENCY DEPT VISIT MOD MDM: CPT

## 2021-09-20 PROCEDURE — 73130 X-RAY EXAM OF HAND: CPT | Mod: 26,RT

## 2021-09-20 PROCEDURE — 73200 CT UPPER EXTREMITY W/O DYE: CPT | Mod: 26,RT,MA

## 2021-09-20 RX ORDER — KETOROLAC TROMETHAMINE 30 MG/ML
60 SYRINGE (ML) INJECTION ONCE
Refills: 0 | Status: DISCONTINUED | OUTPATIENT
Start: 2021-09-20 | End: 2021-09-20

## 2021-09-20 RX ADMIN — Medication 60 MILLIGRAM(S): at 09:50

## 2021-09-20 NOTE — ED PROVIDER NOTE - OBJECTIVE STATEMENT
31m no med hx reports right hand pain. hit his hand against his other hand just pta and now feels pain. no numbness. there is soft tissue swelling. no bleeding. ros neg for ha, vision loss, cp, sob, abd pain, fever, chills,

## 2021-09-20 NOTE — ED ADULT NURSE NOTE - OBJECTIVE STATEMENT
32 y/o AAX4, male with no PMH. Presents to the ED with c/c of right thumb pain. Pt states " I was rushing to leave the house and I injured my hand." pt denies any falls.

## 2021-09-20 NOTE — ED PROVIDER NOTE - PHYSICAL EXAMINATION
Gen: Alert, NAD  Head: NC, AT   Eyes: PERRL, EOMI, normal lids/conjunctiva  ENT: normal hearing, patent oropharynx without erythema/exudate, uvula midline  Neck: supple, no tenderness, Trachea midline  Pulm: Bilateral BS, normal resp effort, no wheeze/stridor/retractions  CV: RRR, no M/R/G, 2+ radial and dp pulses bl, no edema  Abd: soft, NT/ND, +BS, no hepatosplenomegaly  Mskel: soft tissue swelling. no ctl spine ttp.   Skin: no rash, no bruising   Neuro: AAOx3, no sensory/motor deficits, CN 2-12 intact

## 2021-09-20 NOTE — ED ADULT TRIAGE NOTE - CHIEF COMPLAINT QUOTE
32 y/o male with no PMH. Presents to the ED with c/c of right thumb pain. Pt states " I was rushing to leave the house and I injured my hand." pt denies any falls.

## 2021-09-20 NOTE — ED ADULT NURSE NOTE - CHIEF COMPLAINT QUOTE
30 y/o male with no PMH. Presents to the ED with c/c of right thumb pain. Pt states " I was rushing to leave the house and I injured my hand." pt denies any falls.

## 2021-09-20 NOTE — ED PROVIDER NOTE - PATIENT PORTAL LINK FT
You can access the FollowMyHealth Patient Portal offered by [facility] by registering at the following website: [email or web address]. By joining [employer]’s FollowMyHealth portal, you will also be able to view your health information using other applications (apps) compatible with our system. You can access the FollowMyHealth Patient Portal offered by [facility] by registering at the following website: [email or web address]. By joining [employer]’s FollowMyHealth portal, you will also be able to view your health information using other applications (apps) compatible with our system.

## 2021-11-27 ENCOUNTER — EMERGENCY (EMERGENCY)
Facility: HOSPITAL | Age: 31
LOS: 1 days | Discharge: HOME | End: 2021-11-27
Attending: EMERGENCY MEDICINE
Payer: COMMERCIAL

## 2021-11-27 VITALS
OXYGEN SATURATION: 83 % | HEART RATE: 74 BPM | HEIGHT: 71 IN | RESPIRATION RATE: 16 BRPM | DIASTOLIC BLOOD PRESSURE: 64 MMHG | WEIGHT: 184.97 LBS | SYSTOLIC BLOOD PRESSURE: 118 MMHG | TEMPERATURE: 98 F

## 2021-11-27 VITALS — RESPIRATION RATE: 18 BRPM

## 2021-11-27 LAB — SARS-COV-2 RNA SPEC QL NAA+PROBE: SIGNIFICANT CHANGE UP

## 2021-11-27 PROCEDURE — 87635 SARS-COV-2 COVID-19 AMP PRB: CPT

## 2021-11-27 PROCEDURE — 99282 EMERGENCY DEPT VISIT SF MDM: CPT

## 2021-11-27 PROCEDURE — 99283 EMERGENCY DEPT VISIT LOW MDM: CPT

## 2021-11-27 NOTE — ED PROVIDER NOTE - NS ED ROS FT
CONSTITUTIONAL: no fever, no chills   EYES: no visual changes, no eye pain   ENMT: no nasal congestion, no throat pain  CARDIOVASCULAR: no chest pain, no edema, no palpitations   RESPIRATORY: no shortness of breath, no cough   GASTROINTESTINAL: no abdominal pain, no nausea, no vomiting, no diarrhea, no constipation   GENITOURINARY: no dysuria, no frequency  MUSCULOSKELETAL: no joint pains, no myalgias, no back pain   SKIN: no rashes  NEUROLOGICAL: no weakness, no headache, no dizziness, no slurred speech, no syncope     All other ROS negative except as per HPI

## 2021-11-27 NOTE — ED PROVIDER NOTE - PATIENT PORTAL LINK FT
You can access the FollowMyHealth Patient Portal offered by [facility] by registering at the following website: [email or web address]. By joining [employer]’s FollowMyHealth portal, you will also be able to view your health information using other applications (apps) compatible with our system.

## 2021-11-27 NOTE — ED PROVIDER NOTE - NSFOLLOWUPINSTRUCTIONS_ED_ALL_ED_FT
English    COVID-19  COVID-19, also known as coronavirus disease or novel coronavirus, is caused by a type of virus that causes respiratory illness. This may lead to inflammation and the buildup of mucus and fluids in the airway of the lungs (pneumonia). There are many different coronaviruses. Most of these viruses only affect animals, but sometimes these viruses can change and infect people.  What are the causes?  This illness is caused by a virus. You may catch the virus by:  Breathing in droplets from an infected person's cough or sneeze.Touching something, like a table or a doorknob, that was exposed to the virus (contaminated) and then touching your mouth, nose, or eyes.Being around animals that carry the virus, or eating uncooked or undercooked meat or animal products that contain the virus.What increases the risk?  You are more likely to develop this condition if you:  Live in or travel to an area with a COVID-19 outbreak.Come in contact with a sick person who recently traveled to an area with a COVID-19 outbreak.Provide care for or live with a person who is infected with COVID-19.What are the signs or symptoms?  COVID-19 causes respiratory illness that can lead to pneumonia. Symptoms of pneumonia may include:  A fever.A cough.Difficulty breathing.How is this diagnosed?  This condition may be diagnosed based on:  Your signs and symptoms, especially if:  You live in an area with a COVID-19 outbreak.You recently traveled to or from an area where the virus is common.You provide care for or live with a person who was diagnosed with COVID-19.A physical exam.Lab tests, which may include:  A nasal swab to take a sample of fluid from your nose.A throat swab to take a sample of fluid from your throat.A sample of mucus from your lungs (sputum).Blood tests.How is this treated?  There is no medicine to treat COVID-19. Your health care provider will talk with you about ways to treat your symptoms. This may include rest, fluids, and over-the-counter medicines.  Follow these instructions at home:  Lifestyle     Use a cool-mist humidifier to add moisture to the air. This can help you breathe more easily.Do not use any products that contain nicotine or tobacco, such as cigarettes, e-cigarettes, and chewing tobacco. If you need help quitting, ask your health care provider.Rest at home as told by your health care provider.Return to your normal activities as told by your health care provider. Ask your health care provider what activities are safe for you.General instructions     Take over-the-counter and prescription medicines only as told by your health care provider.Drink enough fluid to keep your urine pale yellow.Keep all follow-up visits as told by your health care provider. This is important.How is this prevented?     There is no vaccine to help prevent COVID-19 infection. However, there are steps you can take to protect yourself and others from this virus.  To protect yourself:     Do not travel to areas where COVID-19 is a risk. The areas where COVID-19 is reported change often. To identify high-risk areas, check the CDC travel website: wwwnc.cdc.gov/travel/noticesIf you live in, or must travel to, an area where COVID-19 is a risk, take precautions to avoid infection.  Stay away from people who are sick.Stay away from places where there are animals that may carry the virus. This includes places where animals and animal products are sold. Note that both living and dead animals can carry the virus.Wash your hands often with soap and water. If soap and water are not available, use an alcohol-based hand .Avoid touching your mouth, face, eyes, or nose.To protect others:     If you have symptoms, take steps to prevent the virus from spreading to others.  If you think you have a COVID-19 infection, contact your health care provider right away. Tell your health care team that you think you may have a COVID-19 infection.Stay home. Leave your house only to seek medical care.Do not travel while you are sick.Wash your hands often with soap and water. If soap and water are not available, use alcohol-based hand .Stay away from other members of your household. If possible, stay in your own room, separate from others. Use a different bathroom.Make sure that all people in your household wash their hands well and often.Cough or sneeze into a tissue or your sleeve or elbow. Do not cough or sneeze into your hand or into the air.Wear a face mask.Where to find more information  Centers for Disease Control and Prevention: www.cdc.gov/coronavirus/2019-ncov/index.htmlWFranc[facility] Health Health Organization: www.who.int/health-topics/coronavirusContact a health care provider if:  You have traveled to an area where COVID-19 is a risk and you have symptoms of the infection.You have contact with someone who has traveled to an area where COVID-19 is a risk and you have symptoms of the infection.Get help right away if:  You have trouble breathing.You have chest pain.Summary  COVID-19 is caused by a type of virus that causes respiratory illness. This may lead to inflammation and the buildup of mucus and fluids in the airway of the lungs (pneumonia).You are more likely to develop this condition if you live in or travel to an area with a COVID-19 outbreak.There is no medicine to treat COVID-19. Your health care provider will talk with you about ways to treat your symptoms.Take steps to protect yourself and others from infection. Wash your hands often. Stay away from other people who are sick and wear a mask if you are sick.This information is not intended to replace advice given to you by your health care provider. Make sure you discuss any questions you have with your health care provider.    Document Released: 01/23/2020 Document Revised: 03/13/2020 Document Reviewed: 01/23/2020  Elsevier Patient Education © 2020 [employer] Inc[employer]

## 2021-11-27 NOTE — ED PROVIDER NOTE - PHYSICAL EXAMINATION
GENERAL: well appearing, no acute distress   HEAD: atraumatic   EYES: EOMI, pink conjunctiva   ENT: moist oral mucosa   CARDIAC: RRR  RESPIRATORY: no increased work of breathing   MUSCULOSKELETAL: no deformity   NEUROLOGICAL: AAOx3, spontaneous movement of extremities   SKIN: intact   PSYCHIATRIC: cooperative

## 2021-12-26 ENCOUNTER — EMERGENCY (EMERGENCY)
Facility: HOSPITAL | Age: 31
LOS: 1 days | Discharge: HOME | End: 2021-12-26
Payer: COMMERCIAL

## 2021-12-26 VITALS
WEIGHT: 186.29 LBS | TEMPERATURE: 98 F | DIASTOLIC BLOOD PRESSURE: 64 MMHG | HEIGHT: 71 IN | RESPIRATION RATE: 15 BRPM | SYSTOLIC BLOOD PRESSURE: 107 MMHG | HEART RATE: 82 BPM | OXYGEN SATURATION: 97 %

## 2021-12-26 LAB — SARS-COV-2 RNA SPEC QL NAA+PROBE: SIGNIFICANT CHANGE UP

## 2021-12-26 PROCEDURE — 99282 EMERGENCY DEPT VISIT SF MDM: CPT

## 2021-12-26 PROCEDURE — 87635 SARS-COV-2 COVID-19 AMP PRB: CPT

## 2021-12-26 PROCEDURE — 99283 EMERGENCY DEPT VISIT LOW MDM: CPT

## 2021-12-26 NOTE — ED PROVIDER NOTE - PATIENT PORTAL LINK FT
You can access the FollowMyHealth Patient Portal offered by [facility] by registering at the following website: [email or web address]. By joining [employer]’s FollowMyHealth portal, you will also be able to view your health information using other applications (apps) compatible with our system.

## 2021-12-26 NOTE — ED PROVIDER NOTE - CLINICAL SUMMARY MEDICAL DECISION MAKING FREE TEXT BOX
Well appearing pt, not hypoxic. Will text COVID result to pt.   Return precautions explained and questions answered

## 2022-04-06 NOTE — ED ADULT NURSE NOTE - COVID-19 RESULT
NEGATIVE Quality 137: Melanoma: Continuity Of Care - Recall System: Patient information entered into a recall system that includes: target date for the next exam specified AND a process to follow up with patients regarding missed or unscheduled appointments Detail Level: Detailed

## 2022-04-26 NOTE — ED ADULT NURSE NOTE - MUSCULOSKELETAL ASSESSMENT
"Chief Complaint  Neck Pain  Subjective        Mehnaz Raines presents to [facility]  Pt comes in today with c/o neck pain that started about 1 month ago. Has been under a lot of stress lately. Caring for  and father in law. Does a lot for them.  has outbursts and that stresses her out.   Has a lot of tension. Has tried heat and inversion table. Also taking advil 600mg bid.   No radiating pain in arms. Pain with turning head.   Has had some numbness in fingertips. However she states that happens when she is driving and her  has his worse outbursts.   In 2020 had xray of cervical spine and showed mild degenerative changes. Went to PT for about 4-6 weeks and it did help.        Objective     Vital Signs:   /60   Pulse 69   Temp 96.9 °F (36.1 °C)   Resp 16   Ht 162.6 cm (64\")   Wt 59 kg (130 lb)   SpO2 97%   BMI 22.31 kg/m²       BP Readings from Last 3 Encounters:   04/26/22 108/60   10/22/21 116/64   09/29/21 118/70       Wt Readings from Last 3 Encounters:   04/26/22 59 kg (130 lb)   10/22/21 57.6 kg (127 lb)   09/29/21 55.8 kg (123 lb)     Physical Exam  Constitutional:       Appearance: She is well-developed.   Eyes:      Pupils: Pupils are equal, round, and reactive to light.   Cardiovascular:      Rate and Rhythm: Normal rate and regular rhythm.   Pulmonary:      Effort: Pulmonary effort is normal.      Breath sounds: Normal breath sounds.   Musculoskeletal:      Cervical back: No swelling, tenderness, bony tenderness or crepitus. Decreased range of motion.   Neurological:      Mental Status: She is alert and oriented to person, place, and time.        Result Review :   The following data was reviewed by: DINAH Hernandez on 04/26/2022:      Data reviewed: Radiologic studies xrays          Assessment and Plan    Diagnoses and all orders for this visit:    1. Degenerative disc disease, cervical (Primary)  -     methylPREDNISolone (MEDROL) 4 " MG dose pack; Take as directed on package instructions.  Dispense: 21 tablet; Refill: 0  -     TiZANidine (Zanaflex) 4 MG capsule; Take 1 capsule by mouth 3 (Three) Times a Day As Needed for Muscle Spasms.  Dispense: 30 capsule; Refill: 0    medrol dose pack  zanaflex prn  Cont heat therapy  Stretches  If no improvement, will get MRI  During this office visit, we discussed the pertinent aspects of the visit and treatment recommendations. Pt verbalizes understanding. Follow up was discussed. Patient was given the opportunity to ask questions and discuss other concerns.         Follow Up   Return if symptoms worsen or fail to improve.  Patient was given instructions and counseling regarding her condition or for health maintenance advice. Please see specific information pulled into the AVS if appropriate.        WDL

## 2022-09-02 NOTE — ED PROVIDER NOTE - NSDCPRINTRESULTS_ED_ALL_ED
Hand Range of Motion Instructions      Dr. Yesenia Gonzalez    Be cautions in resuming fulll activities and use of the hand for next 2 - 4 weeks. Perform the following exercises VIGOROUSLY at least four times a day. Exercises should be performed in the seated position with elbow on tabletop or other firm surface. If you cannot make these motions on your own, you may use other hand to assist in making these motions. Fully straighten fingers until hand is flat. Fully bend fingers until hand is in a full fist.   Bend wrist forward and backward (grasp hand around knuckles with other hand to do so). Rotate forearm so that your palm faces towards your face. Rotate forearm so that your palm faces away from your face (grasp hand around wrist with other hand to do so). Fully straighten elbow. Fully bend elbow. Continue light use of the hand progressing to more normal us as it feels comfortable to do so. In 2 - 4 weeks you may discontinue using the brace (if you were using one) and resume normal use of the hand and wrist if you have regained full and painless motion and function. If you are unable to achieve  full and painless motion and function over 4 weeks, please call the office at [phone] to schedule a follow-up appointment with Dr. Suzan Almonte. Thank you for choosing [facility]icians for your Hand and Upper Extremity needs. If we can be of any further assistance to you, please do not hesitate to contact us.     Office Phone Number:  [phone]  or  [phone] Patient requests all Lab, Cardiology, and Radiology Results on their Discharge Instructions

## 2023-09-14 NOTE — ED PROVIDER NOTE - NEURO NEGATIVE STATEMENT, MLM
Patient has ANAISHampshire Memorial Hospital CTR 
no loss of consciousness, no gait abnormality, no headache, no sensory deficits, and no weakness.

## 2024-02-02 NOTE — ED PROVIDER NOTE - OBJECTIVE STATEMENT
Ana Maria is a 36 year old who is being evaluated via a billable telephone visit.      What phone number would you like to be contacted at? [phone]  How would you like to obtain your AVS? Dyana    Distant Location (provider location):  On-site    Assessment & Plan     Acute sinusitis with symptoms > 10 days    - fluticasone (FLONASE) 50 MCG/ACT nasal spray  Dispense: 16 g; Refill: 1  - amoxicillin (AMOXIL) 875 MG tablet  Dispense: 20 tablet; Refill: 0    Other migraine with status migrainosus, not intractable  Recommend starting abortive therapy with amitriptyline if not improving follow-up with neurology  - Adult Neurology [facility] Referral  - amitriptyline (ELAVIL) 25 MG tablet  Dispense: 53 tablet; Refill: 0    Encounter for smoking cessation counseling  - nicotine (NICODERM CQ) 21 MG/24HR 24 hr patch  Dispense: 30 patch; Refill: 0      BMI  Estimated body mass index is 34.63 kg/m  as calculated from the following:    Height as of 12/26/23: 1.524 m (5').    Weight as of 12/26/23: 80.4 kg (177 lb 4.8 oz).   Weight management plan: Discussed healthy diet and exercise guidelines      See Patient Instructions    Subjective   Ana Maria is a 36 year old, presenting for the following health issues:  Sinus Problem and Headache        2/2/2024    11:19 AM   Additional Questions   Roomed by Roxanna WHARTON     History of Present Illness       Reason for visit:  Sinus infection, ongoing hernia pain and ongoing Daily migraines since november 2023    She eats 2-3 servings of fruits and vegetables daily.She consumes 1 sweetened beverage(s) daily.She exercises with enough effort to increase her heart rate 30 to 60 minutes per day.  She exercises with enough effort to increase her heart rate 3 or less days per week.   She is taking medications regularly.       Patient states that she has been having pain in her hernia since November. Tylenol is not helping.     Acute Illness  Acute illness concerns:   Onset/Duration: 2  weeks  Symptoms:  Fever: No  Chills/Sweats: No  Headache (location?): YES  Sinus Pressure: YES  Conjunctivitis:  No  Ear Pain: no  Rhinorrhea: YES  Congestion: YES  Sore Throat: No  Cough: YES  Wheeze: No  Fatigue/Achiness: No  Sick/Strep Exposure: No  Therapies tried and outcome: tylenol, allergy meds    Headache  Onset: a couple months  Description:   Location: bilateral in the frontal area, bilateral in the occipital area   Character: squeezing pain  Frequency:  every day  Duration:    Intensity: moderate  Progression of Symptoms:  worsening  Accompanying Signs & Symptoms:  Stiff neck: no  Neck or upper back pain: no  Fever: no  Sinus pressure: YES  Nausea or vomiting: YES  Dizziness: no  Numbness: no  Weakness: no  Visual changes: YES  History:   Head trauma: no  Family history of migraines: YES  Previous tests for headaches: no  Neurologist evaluations: no  Able to do daily activities: YES  Wake with a headaches: YES  Do headaches wake you up: no  Daily pain medication use: no  Work/school stressors/changes: no  Precipitating factors:   Does light make it worse: YES  Does sound make it worse: YES  Alleviating factors:  Does sleep help: no    Therapies Tried and outcome: tylenol        Has had ongoing hernia pain           Review of Systems  Constitutional, HEENT, cardiovascular, pulmonary, gi and gu systems are negative, except as otherwise noted.      Objective           Vitals:  No vitals were obtained today due to virtual visit.    Physical Exam   General: Alert and no distress //Respiratory: No audible wheeze, cough, or shortness of breath // Psychiatric:  Appropriate affect, tone, and pace of words      No results found for any visits on 02/02/24.      Phone call duration: 21 minutes  Signed Electronically by: SY Romo CNP     28 year old male with no significant PMH presenting to ED due to nasal congestion and URI symptoms x 1-2 days. No fever/chills, no sore throat no chest pain or congestion.

## 2024-04-15 NOTE — ED ADULT TRIAGE NOTE - BP NONINVASIVE DIASTOLIC (MM HG)
78

## 2024-07-08 NOTE — ED ADULT NURSE NOTE - TEMPLATE
Outpatient Care Management Note:  In basket referral received after recent ER visit.  In basket message states SDOH concerns, patient needs assistance locating her  at a [facility].     Outreach call placed to Ms. Fallon.  Introduced myself and my role.     Current Symptoms:  Ms. Fallon states her constipation is improved.  States she has had issues with same for years.     Medications Obtained:  Miralax prescribed by ER.  Ms. Fallon states she has the medication and is taking it as prescribed.  Does not wish to complete a medication review at this time.     Follow up Appointments:  Has a scheduled appointment with her PCP on 7/19/2024.    Transportation Issues:  Ms. Fallon states her daughter provides transportation if needed.  Denies any needs.     Any Assistance Required:  Ms. Fallon apologizes for any confusion.  She knows her  is on the fifth floor at \[facility]\"".  She requested that a nurse call her with an update on her  as her daughter is not able to take her to the hospital today.  Advised her I would reach out to the IP CM team and pass along her request.  She is agreeable to that plan.  Denies any other needs.     Epic chat message sent to [facility] IP CM team asking if they can have someone call the patient.     Received return message from Mary Nieto that they have been providing updates to the daughter.       
Cold/Sinus

## 2024-10-16 NOTE — BRIEF OPERATIVE NOTE - ASSISTANT(S)
[facility] APPOINTMENT CONFIRMATION      Date: 10/17/2024    Time: 11:00AM    Provider name: Dr. Shaji Claudio    Location: Neurology Locations: [address] [place] [address]    Zoom link sent (if applicable): No    Will patient be in [place] for the virtual visit? Yes    Is patient currently in a facility? No    Alternative contact information: N/A    Appointment confirmed: Yes, confirmed appointment with mother Destini.     \"Please ensure you bring the medication bottles, including the dates and times you take each medication.\"  New Patients: \"Please bring any outside records or images.\"    \"We do have free parking available in the main hospital parking lot. However, parking can be difficult to find so we ask that you arrive 40 minutes prior to your appointment.\"      SAURABH Zacarias

## 2024-10-21 NOTE — ED ADULT NURSE NOTE - PAIN RATING/NUMBER SCALE (0-10): REST
Post-Care Instructions: I reviewed with the patient in detail post-care instructions. Patient is to wear sunprotection, and avoid picking at any of the treated lesions. Pt may apply Vaseline to crusted or scabbing areas.\\n\\nNO CHARGE Show Spray Paint Technique Variable?: Yes Price (Use Numbers Only, No Special Characters Or $): 0 Spray Paint Text: The liquid nitrogen was applied to the skin utilizing a spray paint frosting technique. Consent: The patient's verbal consent was obtained including but not limited to risks of crusting, scabbing, blistering, scarring, darker or lighter pigmentary change, recurrence, incomplete removal and infection. The patient understands that the procedure is cosmetic in nature and is not covered by insurance. Detail Level: Detailed Billing Information: Bill by Static Price Spray Paint Technique: No 2

## 2025-03-03 NOTE — ED ADULT NURSE NOTE - NS PRO AD BILL OF RIGHTS
Well Visit, Over 65: Care Instructions  Well visits can help you stay healthy. Your doctor has checked your overall health and may have suggested ways to take good care of yourself. Your doctor also may have recommended tests. You can help prevent illness with healthy eating, good sleep, vaccinations, regular exercise, and other steps.    Get the tests that you and your doctor decide on. Depending on your age and risks, examples might include hearing tests as well as screening for colon, breast, and lung cancer. Screening helps find diseases before any symptoms appear.   Eat healthy foods. Choose fruits, vegetables, whole grains, lean protein, and low-fat dairy foods. Limit saturated fat, and reduce salt.     Limit alcohol. Men should have no more than 2 drinks a day. Women should have no more than 1. For some people, no alcohol is the best choice.   Exercise. It can help prevent falls. Get at least 30 minutes of exercise on most days of the week. Walking, yoga, and andrews chi can be good choices.     Reach and stay at your healthy weight. This will lower your risk for many health problems.   Take care of your mental health. Try to stay connected with friends, family, and community, and find ways to manage stress.     If you're feeling depressed or hopeless, talk to someone. A counselor can help. If you don't have a counselor, talk to your doctor.   Talk to your doctor if you think you may have a problem with alcohol or drug use. This includes prescription medicines and illegal drugs.     Avoid tobacco and nicotine: Don't smoke, vape, or chew. If you need help quitting, talk to your doctor.   Practice safer sex. Getting tested, using condoms or dental dams, and limiting sex partners can help prevent STIs.     Make an advance directive. This is a legal way to tell your family and doctor what you want to happen at the end of your life or when you can't speak for yourself.   Prevent problems where you can. Protect  No

## 2025-06-06 NOTE — ED ADULT TRIAGE NOTE - NS ED NURSE BANDS TYPE
Detail Level: Detailed Add 42154 Cpt? (Important Note: In 2017 The Use Of 85138 Is Being Tracked By Cms To Determine Future Global Period Reimbursement For Global Periods): yes Name band;